# Patient Record
Sex: FEMALE | Race: WHITE
[De-identification: names, ages, dates, MRNs, and addresses within clinical notes are randomized per-mention and may not be internally consistent; named-entity substitution may affect disease eponyms.]

---

## 2019-01-10 ENCOUNTER — HOSPITAL ENCOUNTER (EMERGENCY)
Dept: HOSPITAL 25 - UCCORT | Age: 34
Discharge: HOME | End: 2019-01-10
Payer: COMMERCIAL

## 2019-01-10 VITALS — SYSTOLIC BLOOD PRESSURE: 111 MMHG | DIASTOLIC BLOOD PRESSURE: 66 MMHG

## 2019-01-10 DIAGNOSIS — J06.9: Primary | ICD-10-CM

## 2019-01-10 PROCEDURE — G0463 HOSPITAL OUTPT CLINIC VISIT: HCPCS

## 2019-01-10 PROCEDURE — 99211 OFF/OP EST MAY X REQ PHY/QHP: CPT

## 2019-01-10 PROCEDURE — 87651 STREP A DNA AMP PROBE: CPT

## 2019-01-10 NOTE — ED
Throat Pain/Nasal Congestion





- HPI Summary


HPI Summary: 





33 yr old female with the complaint of sore throat.  Onset of symptoms 2-3 

weeks ago.  Pain is worse with swallowing.  She has not had drooling.  She has 

not had fever.  She has had some nasal congestion.  She has had some cough but 

only for three days.  No cough now.  





- History of Current Complaint


Chief Complaint: UCGeneralIllness


Time Seen by Provider: 01/10/19 10:54





- Allergies/Home Medications


Allergies/Adverse Reactions: 


 Allergies











Allergy/AdvReac Type Severity Reaction Status Date / Time


 


cephalexin Allergy  Vomiting Verified 01/10/19 09:58











Home Medications: 


 Home Medications





Levonorgestrel (IUD) (NF) [Mirena (NF)] 20 mcg IU ONCE 01/10/19 [History 

Confirmed 01/10/19]











PMH/Surg Hx/FS Hx/Imm Hx


Infectious Disease History: No


Infectious Disease History: 


   Denies: Traveled Outside the [place] in Last 30 Days





- Family History


Known Family History: Positive: None, Cardiac Disease, Hypertension, Diabetes





- Social History


Occupation: Employed Full-time


Alcohol Use: Occasionally


Substance Use Type: Reports: None


Smoking Status (MU): Never Smoked Tobacco


Have You Smoked in the Last Year: No





Review of Systems


Constitutional: Negative


Positive: Sore Throat, Nasal Discharge.  Negative: Ear Ache


Positive: Cough


All Other Systems Reviewed And Are Negative: Yes





Physical Exam


Triage Information Reviewed: Yes


Vital Signs On Initial Exam: 


 Initial Vitals











Temp Pulse Resp BP Pulse Ox


 


 98.5 F   80   16   111/66   100 


 


 01/10/19 09:51  01/10/19 09:51  01/10/19 09:51  01/10/19 09:51  01/10/19 09:51











Vital Signs Reviewed: Yes


Appearance: Positive: Well-Appearing, No Pain Distress


Skin: Positive: Warm


Eyes: Positive: EOMI


ENT: Positive: Pharyngeal erythema, TMs normal.  Negative: Nasal congestion, 

Nasal drainage, Sinus tenderness


Neck: Positive: Nontender


Respiratory/Lung Sounds: Positive: Clear to Auscultation, Breath Sounds Present


Cardiovascular: Positive: RRR.  Negative: Murmur


Abdomen Description: Negative: Distended


Musculoskeletal: Positive: Strength/ROM Intact


Neurological: Positive: Sensory/Motor Intact, Alert, Oriented to Person Place, 

Time, CN Intact II-III, Normal Gait


Psychiatric: Positive: Normal





- Blanca Coma Scale


Best Eye Response: 4 - Spontaneous


Best Motor Response: 6 - Obeys Commands


Best Verbal Response: 5 - Oriented


Coma Scale Total: 15





Diagnostics





- Vital Signs


 Vital Signs











  Temp Pulse Resp BP Pulse Ox


 


 01/10/19 09:51  98.5 F  80  16  111/66  100














- Laboratory


Lab Results: 


 Lab Results











  01/10/19 Range/Units





  10:49 


 


Group A Strep Rapid  Negative  (Negative)  











Lab Statement: Any lab studies that have been ordered have been reviewed, and 

results considered in the medical decision making process.





EENT Course/Dx





- Course


Course Of Treatment: 33 yr old with URI.  rapid strep neg.





- Diagnoses


Provider Diagnoses: 


 Upper respiratory infection








Discharge





- Sign-Out/Discharge


Documenting (check all that apply): Patient Departure


All imaging exams completed and their final reports reviewed: No Studies





- Discharge Plan


Condition: Good


Disposition: HOME


Patient Education Materials:  Upper Respiratory Infection (ED)


Referrals: 


Robinson Osullivan MD [Primary Care Provider] - 2 Days





- Billing Disposition and Condition


Condition: GOOD


Disposition: Home